# Patient Record
(demographics unavailable — no encounter records)

---

## 2025-04-02 NOTE — PLAN
[TextEntry] : #1 Benign Paroxysmal Positional Vertigo, right ear - Discussed with patient that BPPV causes brief episodes of mild to intense dizziness. It is usually triggered by specific changes head position. This might occur when you tip your head up or down, when you lie down, or when you turn over or sit up in bed.  - BPPV occurs when crystals within the otolith organs in your inner ear which monitor head's movements become dislodged. When they become dislodged, they can move into one of the semicircular canals. This causes the semicircular canal to become sensitive to head position changes it would normally not respond to, which is what triggers the sense of disequilibrium.  -The Epley Maneuver is a crystal repositioning procedure that moves the crystals from the fluid-filled semicircular canals of your inner ear into a tiny baglike open area (vestibule) that houses one of the otolith organs in your ear, where these particles don't cause trouble and are more easily resorbed.  - Epley Maneuver Handout Given  - Order Given for Vestibular Rehabilitation Therapy to patient can learn at home how to reposition when these acute episodes of BPPV occur. - Return to office in 1 month for follow up, or sooner if symptoms persist or worsen

## 2025-04-02 NOTE — HISTORY OF PRESENT ILLNESS
[de-identified] : 22-year-old female presents here today as Urgent Care f/u 3/30/25. She was seen for dizziness, nausea, and photophobia. She was given Meclizine, with which she had some relief. She states that she is slightly dizzy, but not nearly as dizzy as she was on Sunday when she went to . She states she feels as if the room is spinning around her with changes in position and when she closes her eyes. She admits to hx of vertigo after experiencing 2 concussions within a span of 2 weeks 7 years ago which started the sxs, she had done vestibular therapy, and it had subsided at the time. Then, this past December she had another concussion, and her symptoms had restarted. Denies otalgia, otorrhea, hearing loss, migraine.

## 2025-04-02 NOTE — PHYSICAL EXAM
[Normal] : gums are normal [Removed] : palatine tonsils previously removed [] : [place]-Hallpike test is positive [de-identified] : + right side

## 2025-04-09 NOTE — HISTORY OF PRESENT ILLNESS
[FreeTextEntry1] : This 22-year-old woman was seen in neurological consultation today. She reports to concussions back in 2018 and had postconcussive symptoms going on for about 11 months Still gets intermittent vertigo as a result of that and recently saw ENT and will be going for vestibular therapy On 12/8/2024 she got elbowed quite hard in the left temple at a concert There was no loss of consciousness Couple of days later she started with headaches and photophobia which subsided In  February she had neck pain and went for physical therapy which helped 2 weeks ago she again started with the photophobia and headaches along with vertigo  Had a CT scan of the head on 12/10/2024 at [facility] which was reported normal  Denies any nausea or vomiting Denies any focal weakness or paresthesias  Medical history is significant for depression, well-controlled on medication

## 2025-04-09 NOTE — PHYSICAL EXAM
[General Appearance - Alert] : alert [General Appearance - In No Acute Distress] : in no acute distress [General Appearance - Well-Appearing] : healthy appearing [Oriented To Time, Place, And Person] : oriented to person, place, and time [Impaired Insight] : insight and judgment were intact [Affect] : the affect was normal [Memory Recent] : recent memory was not impaired [Person] : oriented to person [Place] : oriented to place [Time] : oriented to time [Concentration Intact] : normal concentrating ability [Visual Intact] : visual attention was ~T not ~L decreased [Fluency] : fluency intact [Comprehension] : comprehension intact [Past History] : adequate knowledge of personal past history [Cranial Nerves Optic (II)] : visual acuity intact bilaterally,  visual fields full to confrontation, pupils equal round and reactive to light [Cranial Nerves Oculomotor (III)] : extraocular motion intact [Cranial Nerves Trigeminal (V)] : facial sensation intact symmetrically [Cranial Nerves Facial (VII)] : face symmetrical [Cranial Nerves Vestibulocochlear (VIII)] : hearing was intact bilaterally [Cranial Nerves Glossopharyngeal (IX)] : tongue and palate midline [Cranial Nerves Accessory (XI - Cranial And Spinal)] : head turning and shoulder shrug symmetric [Cranial Nerves Hypoglossal (XII)] : there was no tongue deviation with protrusion [Motor Tone] : muscle tone was normal in all four extremities [Motor Strength] : muscle strength was normal in all four extremities [No Muscle Atrophy] : normal bulk in all four extremities [Sensation Tactile Decrease] : light touch was intact [Abnormal Walk] : normal gait [Balance] : balance was intact [2+] : Patella left 2+ [FreeTextEntry1] : Hallpike maneuver negative  There is no cervical palpation tenderness. There is full range of movement of the cervical spine  [Paresis Pronator Drift Right-Sided] : no pronator drift on the right [Paresis Pronator Drift Left-Sided] : no pronator drift on the left [Sensation Pain / Temperature Decrease] : pain and temperature was intact [Romberg's Sign] : Romberg's sign was negtive [Past-pointing] : there was no past-pointing [Tremor] : no tremor present [Coordination - Dysmetria Impaired Finger-to-Nose Bilateral] : not present [PERRL With Normal Accommodation] : pupils were equal in size, round, reactive to light, with normal accommodation [Extraocular Movements] : extraocular movements were intact [Full Visual Field] : full visual field

## 2025-04-09 NOTE — ASSESSMENT
[FreeTextEntry1] : Postconcussive symptoms Now with photophobia, headaches, vertigo She will be starting vestibular therapy Her neurological examination is normal I would like to check a brain MRI and an EEG  Further discussions to follow